# Patient Record
Sex: FEMALE | Race: WHITE | URBAN - METROPOLITAN AREA
[De-identification: names, ages, dates, MRNs, and addresses within clinical notes are randomized per-mention and may not be internally consistent; named-entity substitution may affect disease eponyms.]

---

## 2021-12-15 ENCOUNTER — OFFICE VISIT (OUTPATIENT)
Dept: URBAN - METROPOLITAN AREA CLINIC 40 | Facility: CLINIC | Age: 76
End: 2021-12-15

## 2021-12-16 ENCOUNTER — DASHBOARD ENCOUNTERS (OUTPATIENT)
Age: 76
End: 2021-12-16

## 2021-12-16 ENCOUNTER — WEB ENCOUNTER (OUTPATIENT)
Dept: URBAN - METROPOLITAN AREA CLINIC 40 | Facility: CLINIC | Age: 76
End: 2021-12-16

## 2021-12-16 ENCOUNTER — OFFICE VISIT (OUTPATIENT)
Dept: URBAN - METROPOLITAN AREA CLINIC 40 | Facility: CLINIC | Age: 76
End: 2021-12-16
Payer: MEDICARE

## 2021-12-16 DIAGNOSIS — D75.1 POLYCYTHEMIA: ICD-10-CM

## 2021-12-16 DIAGNOSIS — R19.5 POSITIVE COLORECTAL CANCER SCREENING USING COLOGUARD TEST: ICD-10-CM

## 2021-12-16 PROCEDURE — 99203 OFFICE O/P NEW LOW 30 MIN: CPT | Performed by: PHYSICIAN ASSISTANT

## 2021-12-16 RX ORDER — ZOLEDRONIC ACID 5 MG/100ML
AS DIRECTED INJECTION, SOLUTION INTRAVENOUS
Status: ACTIVE | COMMUNITY

## 2021-12-16 RX ORDER — FOLIC ACID/MULTIVIT,IRON,MINER 0.4MG-18MG
AS DIRECTED TABLET ORAL
Status: ACTIVE | COMMUNITY

## 2021-12-16 RX ORDER — ROSUVASTATIN CALCIUM 10 MG/1
AS DIRECTED TABLET, FILM COATED ORAL
Status: ACTIVE | COMMUNITY

## 2021-12-16 RX ORDER — ANTIOX #8/OM3/DHA/EPA/LUT/ZEAX 250-2.5 MG
AS DIRECTED CAPSULE ORAL
Status: ACTIVE | COMMUNITY

## 2021-12-16 RX ORDER — LEVOTHYROXINE SODIUM 0.1 MG/1
AS DIRECTED TABLET ORAL
Status: ACTIVE | COMMUNITY

## 2021-12-16 NOTE — HPI-TODAY'S VISIT:
Ms. Ordaz is a 76-year-old white female who presents to the office today to schedule colonoscopy.  Recent Cologuard positive.  This was completed as she was 3 years from her prior Cologuard testing which was negative.  This was ordered as she reports difficulty tolerating her last bowel prep for colonoscopy by Dr. Sam of which was completed 8 years ago.  Denies any history of colon polyps.  No family history of colon cancer.  Has no GI complaints today.  Denies abdominal pain, change in bowel habits or rectal bleeding.  Overall good appetite weight is stable.

## 2021-12-20 PROBLEM — 271840007 ABNORMAL FECES: Status: ACTIVE | Noted: 2021-12-20

## 2022-01-12 ENCOUNTER — OFFICE VISIT (OUTPATIENT)
Dept: URBAN - METROPOLITAN AREA SURGERY CENTER 30 | Facility: SURGERY CENTER | Age: 77
End: 2022-01-12
Payer: MEDICARE

## 2022-01-12 ENCOUNTER — CLAIMS CREATED FROM THE CLAIM WINDOW (OUTPATIENT)
Dept: URBAN - METROPOLITAN AREA CLINIC 4 | Facility: CLINIC | Age: 77
End: 2022-01-12
Payer: MEDICARE

## 2022-01-12 DIAGNOSIS — D12.3 ADENOMA OF TRANSVERSE COLON: ICD-10-CM

## 2022-01-12 DIAGNOSIS — D12.3 BENIGN NEOPLASM OF TRANSVERSE COLON: ICD-10-CM

## 2022-01-12 DIAGNOSIS — R19.5 ABNORMAL CONSISTENCY OF STOOL: ICD-10-CM

## 2022-01-12 PROCEDURE — G8907 PT DOC NO EVENTS ON DISCHARG: HCPCS | Performed by: INTERNAL MEDICINE

## 2022-01-12 PROCEDURE — 88305 TISSUE EXAM BY PATHOLOGIST: CPT | Performed by: PATHOLOGY

## 2022-01-12 PROCEDURE — 45380 COLONOSCOPY AND BIOPSY: CPT | Performed by: INTERNAL MEDICINE

## 2022-01-12 RX ORDER — ANTIOX #8/OM3/DHA/EPA/LUT/ZEAX 250-2.5 MG
AS DIRECTED CAPSULE ORAL
Status: ACTIVE | COMMUNITY

## 2022-01-12 RX ORDER — LEVOTHYROXINE SODIUM 0.1 MG/1
AS DIRECTED TABLET ORAL
Status: ACTIVE | COMMUNITY

## 2022-01-12 RX ORDER — ZOLEDRONIC ACID 5 MG/100ML
AS DIRECTED INJECTION, SOLUTION INTRAVENOUS
Status: ACTIVE | COMMUNITY

## 2022-01-12 RX ORDER — ROSUVASTATIN CALCIUM 10 MG/1
AS DIRECTED TABLET, FILM COATED ORAL
Status: ACTIVE | COMMUNITY

## 2022-01-12 RX ORDER — FOLIC ACID/MULTIVIT,IRON,MINER 0.4MG-18MG
AS DIRECTED TABLET ORAL
Status: ACTIVE | COMMUNITY

## 2022-01-27 ENCOUNTER — OFFICE VISIT (OUTPATIENT)
Dept: URBAN - METROPOLITAN AREA CLINIC 40 | Facility: CLINIC | Age: 77
End: 2022-01-27